# Patient Record
Sex: FEMALE | Race: WHITE | NOT HISPANIC OR LATINO | Employment: FULL TIME | URBAN - METROPOLITAN AREA
[De-identification: names, ages, dates, MRNs, and addresses within clinical notes are randomized per-mention and may not be internally consistent; named-entity substitution may affect disease eponyms.]

---

## 2021-02-23 ENCOUNTER — IMMUNIZATION (OUTPATIENT)
Dept: NURSING | Facility: CLINIC | Age: 46
End: 2021-02-23
Payer: COMMERCIAL

## 2021-02-23 PROCEDURE — 91300 PR COVID VAC PFIZER DIL RECON 30 MCG/0.3 ML IM: CPT

## 2021-02-23 PROCEDURE — 0001A PR COVID VAC PFIZER DIL RECON 30 MCG/0.3 ML IM: CPT

## 2021-03-07 ENCOUNTER — HEALTH MAINTENANCE LETTER (OUTPATIENT)
Age: 46
End: 2021-03-07

## 2021-03-16 ENCOUNTER — IMMUNIZATION (OUTPATIENT)
Dept: NURSING | Facility: CLINIC | Age: 46
End: 2021-03-16
Attending: INTERNAL MEDICINE
Payer: COMMERCIAL

## 2021-03-16 PROCEDURE — 91300 PR COVID VAC PFIZER DIL RECON 30 MCG/0.3 ML IM: CPT

## 2021-03-16 PROCEDURE — 0002A PR COVID VAC PFIZER DIL RECON 30 MCG/0.3 ML IM: CPT

## 2021-10-11 ENCOUNTER — HEALTH MAINTENANCE LETTER (OUTPATIENT)
Age: 46
End: 2021-10-11

## 2022-03-27 ENCOUNTER — HEALTH MAINTENANCE LETTER (OUTPATIENT)
Age: 47
End: 2022-03-27

## 2022-04-20 ENCOUNTER — MEDICAL CORRESPONDENCE (OUTPATIENT)
Dept: HEALTH INFORMATION MANAGEMENT | Facility: CLINIC | Age: 47
End: 2022-04-20
Payer: COMMERCIAL

## 2022-04-28 ENCOUNTER — TRANSCRIBE ORDERS (OUTPATIENT)
Dept: OTHER | Age: 47
End: 2022-04-28
Payer: COMMERCIAL

## 2022-04-28 DIAGNOSIS — R92.8 ABNORMAL MAMMOGRAM: Primary | ICD-10-CM

## 2022-05-02 ENCOUNTER — PATIENT OUTREACH (OUTPATIENT)
Dept: ONCOLOGY | Facility: CLINIC | Age: 47
End: 2022-05-02
Payer: COMMERCIAL

## 2022-05-02 NOTE — PROGRESS NOTES
New Patient Oncology Nurse Navigator Note     Referring provider: Dr Hernandez Vasquez     Referring Clinic/Organization:  from [facility]      Referred to (specialty:) Breast Provider Referral      Date Referral Received: May 2, 2022     Evaluation for:  Benign breast condition     Clinical History (per Nurse review of records provided):      Patient had a RIGHT breast diagnostic mammogram and ultrasound at [facility] on 4/19/22 after a distortion was noted in the right breast on recent screening mammogram.  On mammogram, additional views proved the distortion persists in the 12:00 position of the right breast, 3 cm deep to the nipple.  No central mass or calcifications.  On ultrasound there is a very subtle distortion, easiest to appreciate on the radial/slightly oblique plan at 12:00, 3-4 cm from the nipple.  No discrete mass and certainly not as obvious in the antiradial plane.  A stereotactic core biopsy of the distortion is recommended.  Given the current findings radial scar is most likely, though a lobular malignancy is also a consideration.      Records Location: Care Everywhere, Media and See Bookmarked material    IB request from Referrals team - Dr Hernandez Vasquez from [facility] (phone [phone] fax [phone]) is referring to ONC for dx: abnormal mammogram - requesting [facility] location - records needed from [facility]    She needs referral to the Breast center for a biopsy to rule out lobular cancer. Biopsy is scheduling for 5/16/22. Nae Stark RN

## 2022-05-19 ENCOUNTER — ANCILLARY PROCEDURE (OUTPATIENT)
Dept: MAMMOGRAPHY | Facility: CLINIC | Age: 47
End: 2022-05-19
Attending: FAMILY MEDICINE
Payer: COMMERCIAL

## 2022-05-19 DIAGNOSIS — N64.89 BREAST ASYMMETRY: ICD-10-CM

## 2022-05-19 PROCEDURE — 88305 TISSUE EXAM BY PATHOLOGIST: CPT | Mod: 26 | Performed by: PATHOLOGY

## 2022-05-19 PROCEDURE — 19081 BX BREAST 1ST LESION STRTCTC: CPT | Mod: RT | Performed by: STUDENT IN AN ORGANIZED HEALTH CARE EDUCATION/TRAINING PROGRAM

## 2022-05-19 PROCEDURE — 88305 TISSUE EXAM BY PATHOLOGIST: CPT | Mod: TC | Performed by: FAMILY MEDICINE

## 2022-05-19 RX ORDER — LIDOCAINE HYDROCHLORIDE AND EPINEPHRINE 10; 10 MG/ML; UG/ML
10 INJECTION, SOLUTION INFILTRATION; PERINEURAL ONCE
Status: COMPLETED | OUTPATIENT
Start: 2022-05-19 | End: 2022-05-19

## 2022-05-19 RX ADMIN — LIDOCAINE HYDROCHLORIDE AND EPINEPHRINE 10 ML: 10; 10 INJECTION, SOLUTION INFILTRATION; PERINEURAL at 10:17

## 2022-05-24 ENCOUNTER — TELEPHONE (OUTPATIENT)
Dept: MAMMOGRAPHY | Facility: CLINIC | Age: 47
End: 2022-05-24
Payer: COMMERCIAL

## 2022-05-24 DIAGNOSIS — R92.8 ABNORMAL FINDING ON BREAST IMAGING: Primary | ICD-10-CM

## 2022-05-24 DIAGNOSIS — Z80.3 FAMILY HISTORY OF MALIGNANT NEOPLASM OF BREAST: ICD-10-CM

## 2022-05-24 NOTE — PROGRESS NOTES
Right breast 12 o'clock stereotactic core biopsy was performed on 5/19/22.  Final Diagnosis  A(1). Right breast, 12 o'clock, stereotactic core biopsy:  -Benign breast tissue with fibrocystic change (including microcysts with apocrine metaplasia), stromal fibrosis,  and focal sclerosing adenosis  -Calcifications (scant) associated with benign acini  -No atypical or malignant findings    Telephoned and spoke briefly with Priyanka. She requested a call back tomorrow and we agreed to that plan. Nae Stark RN

## 2022-05-24 NOTE — TELEPHONE ENCOUNTER
Spoke to Priyanka about the benign findings from her breast biopsy done last week.  We discussed the Radiologist's recommendation of a surgical consultation to further assess the area and discuss next steps due to her family history.  A referral has been placed and someone from their office will reach out to help coordinate the appointment. Priyanka verbalized understanding and all questions and concerns were answered at this time.

## 2022-05-27 LAB
PATH REPORT.COMMENTS IMP SPEC: NORMAL
PATH REPORT.FINAL DX SPEC: NORMAL
PATH REPORT.GROSS SPEC: NORMAL
PATH REPORT.MICROSCOPIC SPEC OTHER STN: NORMAL
PATH REPORT.RELEVANT HX SPEC: NORMAL
PHOTO IMAGE: NORMAL

## 2022-05-30 NOTE — PROGRESS NOTES
"This is a 46 year old woman who I'm asked to see by Hernandez Vasquez for evaluation of  right breast lesion.  This was a density picked up on mammogram.  She underwent a needle biopsy which shows Normal tissue but this was felt to be discordant by radiology.  She has no palpable mass that she can feel.  She has a fairly significant family history of breast cancer.  Both her mother and an aunt have had breast cancer.    Past Medical History:  No past medical history on file.  No past surgical history on file.      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 Blood product orders may be delayed. Draw one red top and two purple top tubes for all Type and screen/Type and crossmatch orders.     Codeine      Morphine        Social History     Tobacco Use     Smoking status: Never Smoker     Smokeless tobacco: Never Used         Pertinent items are noted in HPI.    Physical exam:  Resp 16   Ht 1.664 m (5' 5.5\")   Wt 90.3 kg (199 lb)   Breastfeeding No   BMI 32.61 kg/m      General: alert, appears stated age, cooperative and mildly obese  Lungs: Clear to auscultation without rales or rhonchi  CV: regular rate and rhythm  Breast: No palpable masses in either breast.  Her right breast is somewhat smaller than the left.  No skin changes noted.  Axilla: no adenopathy      Imaging: Her mammogram and ultrasound were reviewed.    Impression: Mammographic lesion of the right  breast.  Needle biopsy was completely benign but seems discordant to the mammogram findings.  If it had shown a radial scar then I would say it was consistent and " not recommend anything further.  Discussed with the patient the option of going straight to a wire localization and excisional biopsy versus an MRI first.  She does have very dense breast tissue and a family history of breast cancer so I do think she needs something done.  After discussing it she would like to go with the MRI first but understands that if it is anything but completely normal, then I would recommend an excisional biopsy.    Plan: Breast MRI.  If completely normal then I think we can just continue to keep an eye in this area.  If it is not completely normal then she will need a wire localization and excisional biopsy.

## 2022-05-31 ENCOUNTER — OFFICE VISIT (OUTPATIENT)
Dept: SURGERY | Facility: CLINIC | Age: 47
End: 2022-05-31
Attending: FAMILY MEDICINE
Payer: COMMERCIAL

## 2022-05-31 VITALS — RESPIRATION RATE: 16 BRPM | BODY MASS INDEX: 31.98 KG/M2 | WEIGHT: 199 LBS | HEIGHT: 66 IN

## 2022-05-31 DIAGNOSIS — R92.8 MAMMOGRAPHIC BREAST LESION: Primary | ICD-10-CM

## 2022-05-31 PROCEDURE — G0463 HOSPITAL OUTPT CLINIC VISIT: HCPCS

## 2022-05-31 PROCEDURE — 99203 OFFICE O/P NEW LOW 30 MIN: CPT | Performed by: SPECIALIST

## 2022-05-31 RX ORDER — PSEUDOEPHEDRINE HCL 30 MG
TABLET ORAL EVERY 4 HOURS PRN
COMMUNITY

## 2022-05-31 RX ORDER — FLUTICASONE PROPIONATE 50 MCG
1 SPRAY, SUSPENSION (ML) NASAL DAILY
COMMUNITY

## 2022-05-31 RX ORDER — LORATADINE 10 MG/1
10 TABLET ORAL DAILY
COMMUNITY

## 2022-05-31 NOTE — NURSING NOTE
"Priyanka presents to [facility] today for a surgical consult with Dr. Matthews  regarding a right breast lesion. Patient had imaging and biopsy done at the , results were discordant per the radiologist.   RN assessment and EMR update.  Resp 16   Ht 1.664 m (5' 5.5\")   Wt 90.3 kg (199 lb)   Breastfeeding No   BMI 32.61 kg/m   Patient met with Dr. Matthews .  See dictation for details of visit. She will plan Breast MRI, scheduled this for her 6-7-22, [facility].  Support provided, invited calls.    RN time 22 mins.  "

## 2022-05-31 NOTE — LETTER
"    5/31/2022         RE: Priyanka Hathaway  [address] [place] [address]        Dear Colleague,    Thank you for referring your patient, Priyanka Hathaway, to the [facility] [place]. Please see a copy of my visit note below.    This is a 46 year old woman who I'm asked to see by Hernandez Vasquez for evaluation of  right breast lesion.  This was a density picked up on mammogram.  She underwent a needle biopsy which shows Normal tissue but this was felt to be discordant by radiology.  She has no palpable mass that she can feel.  She has a fairly significant family history of breast cancer.  Both her mother and an aunt have had breast cancer.    Past Medical History:  No past medical history on file.  No past surgical history on file.      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 Blood product orders may be delayed. Draw one red top and two purple top tubes for all Type and screen/Type and crossmatch orders.     Codeine      Morphine        Social History     Tobacco Use     Smoking status: Never Smoker     Smokeless tobacco: Never Used         Pertinent items are noted in HPI.    Physical exam:  Resp 16   Ht 1.664 m (5' 5.5\")   Wt 90.3 kg (199 lb)   Breastfeeding No   BMI 32.61 kg/m      General: alert, appears stated age, cooperative and mildly obese  Lungs: Clear to auscultation without rales or rhonchi  CV: regular rate and rhythm  Breast: No palpable masses in either breast.  Her right breast is somewhat smaller than the left.  No skin changes noted.  Axilla: no adenopathy      Imaging: Her " mammogram and ultrasound were reviewed.    Impression: Mammographic lesion of the right  breast.  Needle biopsy was completely benign but seems discordant to the mammogram findings.  If it had shown a radial scar then I would say it was consistent and not recommend anything further.  Discussed with the patient the option of going straight to a wire localization and excisional biopsy versus an MRI first.  She does have very dense breast tissue and a family history of breast cancer so I do think she needs something done.  After discussing it she would like to go with the MRI first but understands that if it is anything but completely normal, then I would recommend an excisional biopsy.    Plan: Breast MRI.  If completely normal then I think we can just continue to keep an eye in this area.  If it is not completely normal then she will need a wire localization and excisional biopsy.            Again, thank you for allowing me to participate in the care of your patient.        Sincerely,        Sophie Matthews MD

## 2022-06-07 ENCOUNTER — HOSPITAL ENCOUNTER (OUTPATIENT)
Facility: AMBULATORY SURGERY CENTER | Age: 47
End: 2022-06-07
Attending: SPECIALIST
Payer: COMMERCIAL

## 2022-06-07 ENCOUNTER — TELEPHONE (OUTPATIENT)
Dept: SURGERY | Facility: CLINIC | Age: 47
End: 2022-06-07
Payer: COMMERCIAL

## 2022-06-07 ENCOUNTER — HOSPITAL ENCOUNTER (OUTPATIENT)
Dept: MRI IMAGING | Facility: HOSPITAL | Age: 47
Discharge: HOME | End: 2022-06-07
Attending: SPECIALIST | Admitting: SPECIALIST
Payer: COMMERCIAL

## 2022-06-07 DIAGNOSIS — R92.8 MAMMOGRAPHIC BREAST LESION: ICD-10-CM

## 2022-06-07 DIAGNOSIS — R92.8 MAMMOGRAPHIC BREAST LESION: Primary | ICD-10-CM

## 2022-06-07 PROCEDURE — 77049 MRI BREAST C-+ W/CAD BI: CPT

## 2022-06-07 PROCEDURE — 255N000002 HC RX 255 OP 636: Performed by: SPECIALIST

## 2022-06-07 PROCEDURE — A9585 GADOBUTROL INJECTION: HCPCS | Performed by: SPECIALIST

## 2022-06-07 RX ORDER — GADOBUTROL 604.72 MG/ML
0.1 INJECTION INTRAVENOUS ONCE
Status: COMPLETED | OUTPATIENT
Start: 2022-06-07 | End: 2022-06-07

## 2022-06-07 RX ADMIN — GADOBUTROL 9 ML: 604.72 INJECTION INTRAVENOUS at 08:19

## 2022-06-07 NOTE — TELEPHONE ENCOUNTER
Spoke with Priyanka today to let her know I was able to get her surgery on next week per the request of Dr. Matthews. I have the date of 6/13 with an arrival time of 8:15 am.    Priyanka informed me she can't make this work because she will be out of town. I told her I can only see a next available the following week of 6/22 and I would consult with Dr. Matthews to confirm if that date would be okay. Once I have consulted with Dr. Matthews I will give Priyanka a call back.

## 2022-06-07 NOTE — LETTER
We've received instruction to get you scheduled for surgery with Dr Matthews. We have that arranged as follows:     Pre-op Physical:  Dr. Matthews will do your pre op physical the day of surgery    Surgery Date: 6/14/2022     Location: [facility]     Approximate Arrival Time: 10:00 am  (Unless instructed differently by the pre-op call nurse)       Prep Tasks and Info:   1. Review your medications with your primary care or prescribing physician; they will advise you which meds to stop and when, and when you can resume taking.  Certain medications needs to be stopped in advance of surgery to proceed safely.    2. You must get tested for COVID-19, even if you are vaccinated.    Outpatient Surgery:  If you are going home the same day of surgery, a home rapid antigen Covid-19 test is required 1-2 days before surgery- regardless of your vaccination status.  Take a photo of the negative result and show to the nurse on the day of surgery. If you test positive, contact our office right away to reschedule surgery. You can buy a home Covid-19 Rapid Antigen test at many local pharmacies, or you can order for free at covid.gov/tests.    Admits after Surgery:  If you are staying overnight or longer following surgery, a PCR test is required 4 days before surgery instead of the rapid antigen test.   Please schedule a PCR test with [employer] by calling [phone] or visit [email or web address].  You are permitted to have this done outside of our system but must fax the result to [phone].     3. Please shower the evening before and morning of surgery with Hibiclens or Exidine soap.  This can be found at your local pharmacy.    4. Fasting instructions will be provided by the pre-op nurse who will call you 1-3 days before surgery.  Typically we advise normal food up to 8 hours before surgery then clear liquids only up to 2 hours before surgery then nothing at all by mouth for 2 hours including  no gum or candy.  The nurse will review your specific instructions with you at the call.      5. You will need an adult to drive you home and stay with you 24 hours after surgery. Public transportation or Medical Van Services are not permitted.    6. You may have one family member wait in the lobby at the surgery center during your surgery. Visitor restrictions are subject to change, please verify with the pre-op nurse when they call.    7. If the community sees a new COVID19 surge, your procedure may need to be postponed. We will contact you if this happens. You will be screened for high-risk exposure to Covid-19 during the pre-op call.  We encourage you to quarantine yourself away from any Covid-19 people for 10 days before surgery to avoid possible last minute cancellations.   When you arrive to the surgery center, you will again be screened for COVID19 symptoms. If you screen positive, your surgery will need to be postponed.    8. We always encourage you to notify your insurance any time you have medical tests or procedures scheduled including surgery. The number is usually right on the back of your insurance card. Please call [facility] Cost of Care at [phone] if you'd like a surgery quote.       Call our office if you have any questions! Thank you!

## 2022-06-08 NOTE — TELEPHONE ENCOUNTER
Spoke with Priyanka washburn regarding surgery scheduling     Patient was informed of the followin. Patient has been informed to consult their PCP/Cardiologist about stopping their blood thinners about a week before surgery.  2. Pre Op Physical will need to be done by PCP or Surgeon see below  3. Required Covid Test with in 4 days prior to surgery. (See Date Below)  4. Ride required after surgery, can not use [employer] or public transportation.    Patient was informed that failure to do so may result in cancellation of surgery      We've received instruction to get you scheduled for surgery with Dr Matthews. We have that arranged as follows:     Pre-op Physical:  Dr. Matthews will do your pre op physical the day of surgery    Surgery Date: 2022     Location: [facility]     Approximate Arrival Time: 10:00 am  (Unless instructed differently by the pre-op call nurse)       Prep Tasks and Info:   1. Review your medications with your primary care or prescribing physician; they will advise you which meds to stop and when, and when you can resume taking.  Certain medications needs to be stopped in advance of surgery to proceed safely.    2. You must get tested for COVID-19, even if you are vaccinated.    Outpatient Surgery:  If you are going home the same day of surgery, a home rapid antigen Covid-19 test is required 1-2 days before surgery- regardless of your vaccination status.  Take a photo of the negative result and show to the nurse on the day of surgery. If you test positive, contact our office right away to reschedule surgery. You can buy a home Covid-19 Rapid Antigen test at many local pharmacies, or you can order for free at covid.gov/tests.    Admits after Surgery:  If you are staying overnight or longer following surgery, a PCR test is required 4 days before surgery instead of the rapid antigen test.   Please schedule a PCR test with [employer] by calling [phone] or visit  [email or web address].  You are permitted to have this done outside of our system but must fax the result to [phone].     3. Please shower the evening before and morning of surgery with Hibiclens or Exidine soap.  This can be found at your local pharmacy.    4. Fasting instructions will be provided by the pre-op nurse who will call you 1-3 days before surgery.  Typically we advise normal food up to 8 hours before surgery then clear liquids only up to 2 hours before surgery then nothing at all by mouth for 2 hours including no gum or candy.  The nurse will review your specific instructions with you at the call.      5. You will need an adult to drive you home and stay with you 24 hours after surgery. Public transportation or Medical Van Services are not permitted.    6. You may have one family member wait in the lobby at the surgery center during your surgery. Visitor restrictions are subject to change, please verify with the pre-op nurse when they call.    7. If the community sees a new COVID19 surge, your procedure may need to be postponed. We will contact you if this happens. You will be screened for high-risk exposure to Covid-19 during the pre-op call.  We encourage you to quarantine yourself away from any Covid-19 people for 10 days before surgery to avoid possible last minute cancellations.   When you arrive to the surgery center, you will again be screened for COVID19 symptoms. If you screen positive, your surgery will need to be postponed.    8. We always encourage you to notify your insurance any time you have medical tests or procedures scheduled including surgery. The number is usually right on the back of your insurance card. Please call  [employer] [facility] Cost of Care at [phone] if you'd like a surgery quote.       Call our office if you have any questions! Thank you!           Surgery Letter sent via [employer]

## 2022-06-09 ENCOUNTER — HOSPITAL ENCOUNTER (OUTPATIENT)
Facility: AMBULATORY SURGERY CENTER | Age: 47
End: 2022-06-09
Attending: SPECIALIST
Payer: COMMERCIAL

## 2022-06-10 VITALS — WEIGHT: 199 LBS | BODY MASS INDEX: 31.98 KG/M2 | HEIGHT: 66 IN

## 2022-06-13 RX ORDER — FENTANYL CITRATE 0.05 MG/ML
50 INJECTION, SOLUTION INTRAMUSCULAR; INTRAVENOUS
Status: CANCELLED | OUTPATIENT
Start: 2022-06-13

## 2022-06-13 RX ORDER — SODIUM CHLORIDE, SODIUM LACTATE, POTASSIUM CHLORIDE, CALCIUM CHLORIDE 600; 310; 30; 20 MG/100ML; MG/100ML; MG/100ML; MG/100ML
INJECTION, SOLUTION INTRAVENOUS CONTINUOUS
Status: CANCELLED | OUTPATIENT
Start: 2022-06-13

## 2022-06-13 RX ORDER — FENTANYL CITRATE 0.05 MG/ML
25 INJECTION, SOLUTION INTRAMUSCULAR; INTRAVENOUS
Status: CANCELLED | OUTPATIENT
Start: 2022-06-13

## 2022-06-13 RX ORDER — MEPERIDINE HYDROCHLORIDE 25 MG/ML
12.5 INJECTION INTRAMUSCULAR; INTRAVENOUS; SUBCUTANEOUS
Status: CANCELLED | OUTPATIENT
Start: 2022-06-13

## 2022-06-13 RX ORDER — LIDOCAINE 40 MG/G
CREAM TOPICAL
Status: CANCELLED | OUTPATIENT
Start: 2022-06-13

## 2022-06-13 RX ORDER — FENTANYL CITRATE 0.05 MG/ML
25 INJECTION, SOLUTION INTRAMUSCULAR; INTRAVENOUS EVERY 5 MIN PRN
Status: CANCELLED | OUTPATIENT
Start: 2022-06-13

## 2022-06-13 RX ORDER — HYDROMORPHONE HYDROCHLORIDE 1 MG/ML
0.5 INJECTION, SOLUTION INTRAMUSCULAR; INTRAVENOUS; SUBCUTANEOUS EVERY 5 MIN PRN
Status: CANCELLED | OUTPATIENT
Start: 2022-06-13

## 2022-06-13 RX ORDER — OXYCODONE HYDROCHLORIDE 5 MG/1
5 TABLET ORAL EVERY 4 HOURS PRN
Status: CANCELLED | OUTPATIENT
Start: 2022-06-13

## 2022-06-13 RX ORDER — ONDANSETRON 4 MG/1
4 TABLET, ORALLY DISINTEGRATING ORAL EVERY 30 MIN PRN
Status: CANCELLED | OUTPATIENT
Start: 2022-06-13

## 2022-06-13 RX ORDER — ONDANSETRON 2 MG/ML
4 INJECTION INTRAMUSCULAR; INTRAVENOUS EVERY 30 MIN PRN
Status: CANCELLED | OUTPATIENT
Start: 2022-06-13

## 2022-06-14 ENCOUNTER — ANCILLARY PROCEDURE (OUTPATIENT)
Dept: MAMMOGRAPHY | Facility: CLINIC | Age: 47
End: 2022-06-14
Attending: SPECIALIST
Payer: COMMERCIAL

## 2022-06-14 DIAGNOSIS — R92.8 MAMMOGRAPHIC BREAST LESION: ICD-10-CM

## 2022-06-14 PROCEDURE — 250N000009 HC RX 250: Performed by: SPECIALIST

## 2022-06-14 PROCEDURE — 272N000431 US BREAST BIOPSY CORE NEEDLE RIGHT

## 2022-06-14 PROCEDURE — 88305 TISSUE EXAM BY PATHOLOGIST: CPT | Mod: TC | Performed by: SPECIALIST

## 2022-06-14 PROCEDURE — 999N000065 MA POST PROCEDURE RIGHT

## 2022-06-14 PROCEDURE — 272N000717 US BREAST BIOPSY CORE NEEDLE RIGHT

## 2022-06-14 RX ADMIN — LIDOCAINE HYDROCHLORIDE 10 ML: 10 INJECTION, SOLUTION INFILTRATION; PERINEURAL at 11:04

## 2022-06-14 RX ADMIN — LIDOCAINE HYDROCHLORIDE 5 ML: 10 INJECTION, SOLUTION EPIDURAL; INFILTRATION; INTRACAUDAL; PERINEURAL at 11:16

## 2022-06-15 PROCEDURE — 88305 TISSUE EXAM BY PATHOLOGIST: CPT | Mod: 26 | Performed by: PATHOLOGY

## 2022-06-16 DIAGNOSIS — R92.8 MAMMOGRAPHIC BREAST LESION: Primary | ICD-10-CM

## 2022-06-30 ENCOUNTER — ANESTHESIA (OUTPATIENT)
Dept: SURGERY | Facility: HOSPITAL | Age: 47
End: 2022-06-30
Payer: COMMERCIAL

## 2022-06-30 ENCOUNTER — HOSPITAL ENCOUNTER (OUTPATIENT)
Facility: HOSPITAL | Age: 47
Discharge: HOME | End: 2022-06-30
Attending: SPECIALIST | Admitting: SPECIALIST
Payer: COMMERCIAL

## 2022-06-30 ENCOUNTER — ANCILLARY PROCEDURE (OUTPATIENT)
Dept: MAMMOGRAPHY | Facility: CLINIC | Age: 47
End: 2022-06-30
Attending: SPECIALIST
Payer: COMMERCIAL

## 2022-06-30 ENCOUNTER — ANESTHESIA EVENT (OUTPATIENT)
Dept: SURGERY | Facility: HOSPITAL | Age: 47
End: 2022-06-30
Payer: COMMERCIAL

## 2022-06-30 VITALS
BODY MASS INDEX: 32.66 KG/M2 | SYSTOLIC BLOOD PRESSURE: 128 MMHG | DIASTOLIC BLOOD PRESSURE: 82 MMHG | OXYGEN SATURATION: 98 % | RESPIRATION RATE: 17 BRPM | HEART RATE: 75 BPM | WEIGHT: 199.3 LBS | TEMPERATURE: 98 F

## 2022-06-30 DIAGNOSIS — R92.8 MAMMOGRAPHIC BREAST LESION: ICD-10-CM

## 2022-06-30 DIAGNOSIS — R92.8 MAMMOGRAPHIC BREAST LESION: Primary | ICD-10-CM

## 2022-06-30 PROCEDURE — 250N000011 HC RX IP 250 OP 636: Performed by: SPECIALIST

## 2022-06-30 PROCEDURE — 250N000011 HC RX IP 250 OP 636: Performed by: NURSE ANESTHETIST, CERTIFIED REGISTERED

## 2022-06-30 PROCEDURE — 19125 EXCISION BREAST LESION: CPT | Mod: RT | Performed by: SPECIALIST

## 2022-06-30 PROCEDURE — 710N000012 HC RECOVERY PHASE 2, PER MINUTE: Performed by: SPECIALIST

## 2022-06-30 PROCEDURE — 88305 TISSUE EXAM BY PATHOLOGIST: CPT | Mod: TC | Performed by: SPECIALIST

## 2022-06-30 PROCEDURE — 999N000065 MA POST PROCEDURE RIGHT

## 2022-06-30 PROCEDURE — 250N000009 HC RX 250: Performed by: SPECIALIST

## 2022-06-30 PROCEDURE — 272N000431 IMAGE GUIDED BREAST LOCALIZATION RIGHT

## 2022-06-30 PROCEDURE — 272N000001 HC OR GENERAL SUPPLY STERILE: Performed by: SPECIALIST

## 2022-06-30 PROCEDURE — 999N000141 HC STATISTIC PRE-PROCEDURE NURSING ASSESSMENT: Performed by: SPECIALIST

## 2022-06-30 PROCEDURE — 258N000003 HC RX IP 258 OP 636: Performed by: ANESTHESIOLOGY

## 2022-06-30 PROCEDURE — 370N000017 HC ANESTHESIA TECHNICAL FEE, PER MIN: Performed by: SPECIALIST

## 2022-06-30 PROCEDURE — 360N000074 HC SURGERY LEVEL 1, PER MIN: Performed by: SPECIALIST

## 2022-06-30 RX ORDER — FENTANYL CITRATE 50 UG/ML
25 INJECTION, SOLUTION INTRAMUSCULAR; INTRAVENOUS EVERY 5 MIN PRN
Status: DISCONTINUED | OUTPATIENT
Start: 2022-06-30 | End: 2022-06-30 | Stop reason: HOSPADM

## 2022-06-30 RX ORDER — ONDANSETRON 2 MG/ML
INJECTION INTRAMUSCULAR; INTRAVENOUS PRN
Status: DISCONTINUED | OUTPATIENT
Start: 2022-06-30 | End: 2022-06-30

## 2022-06-30 RX ORDER — HYDROCODONE BITARTRATE AND ACETAMINOPHEN 5; 325 MG/1; MG/1
1 TABLET ORAL
Status: DISCONTINUED | OUTPATIENT
Start: 2022-06-30 | End: 2022-06-30 | Stop reason: HOSPADM

## 2022-06-30 RX ORDER — FENTANYL CITRATE 50 UG/ML
25 INJECTION, SOLUTION INTRAMUSCULAR; INTRAVENOUS
Status: DISCONTINUED | OUTPATIENT
Start: 2022-06-30 | End: 2022-06-30 | Stop reason: HOSPADM

## 2022-06-30 RX ORDER — NALOXONE HYDROCHLORIDE 0.4 MG/ML
0.2 INJECTION, SOLUTION INTRAMUSCULAR; INTRAVENOUS; SUBCUTANEOUS
Status: DISCONTINUED | OUTPATIENT
Start: 2022-06-30 | End: 2022-06-30 | Stop reason: HOSPADM

## 2022-06-30 RX ORDER — SODIUM CHLORIDE, SODIUM LACTATE, POTASSIUM CHLORIDE, CALCIUM CHLORIDE 600; 310; 30; 20 MG/100ML; MG/100ML; MG/100ML; MG/100ML
INJECTION, SOLUTION INTRAVENOUS CONTINUOUS
Status: DISCONTINUED | OUTPATIENT
Start: 2022-06-30 | End: 2022-06-30 | Stop reason: HOSPADM

## 2022-06-30 RX ORDER — DEXAMETHASONE SODIUM PHOSPHATE 10 MG/ML
INJECTION, SOLUTION INTRAMUSCULAR; INTRAVENOUS PRN
Status: DISCONTINUED | OUTPATIENT
Start: 2022-06-30 | End: 2022-06-30

## 2022-06-30 RX ORDER — BUPIVACAINE HYDROCHLORIDE 2.5 MG/ML
INJECTION, SOLUTION INFILTRATION; PERINEURAL PRN
Status: DISCONTINUED | OUTPATIENT
Start: 2022-06-30 | End: 2022-06-30 | Stop reason: HOSPADM

## 2022-06-30 RX ORDER — LIDOCAINE 40 MG/G
CREAM TOPICAL
Status: DISCONTINUED | OUTPATIENT
Start: 2022-06-30 | End: 2022-06-30 | Stop reason: HOSPADM

## 2022-06-30 RX ORDER — ONDANSETRON 4 MG/1
4 TABLET, ORALLY DISINTEGRATING ORAL EVERY 30 MIN PRN
Status: DISCONTINUED | OUTPATIENT
Start: 2022-06-30 | End: 2022-06-30 | Stop reason: HOSPADM

## 2022-06-30 RX ORDER — FENTANYL CITRATE 50 UG/ML
INJECTION, SOLUTION INTRAMUSCULAR; INTRAVENOUS PRN
Status: DISCONTINUED | OUTPATIENT
Start: 2022-06-30 | End: 2022-06-30

## 2022-06-30 RX ORDER — NALOXONE HYDROCHLORIDE 0.4 MG/ML
0.4 INJECTION, SOLUTION INTRAMUSCULAR; INTRAVENOUS; SUBCUTANEOUS
Status: DISCONTINUED | OUTPATIENT
Start: 2022-06-30 | End: 2022-06-30 | Stop reason: HOSPADM

## 2022-06-30 RX ORDER — IBUPROFEN 200 MG
600 TABLET ORAL
Status: DISCONTINUED | OUTPATIENT
Start: 2022-06-30 | End: 2022-06-30 | Stop reason: HOSPADM

## 2022-06-30 RX ORDER — PROPOFOL 10 MG/ML
INJECTION, EMULSION INTRAVENOUS CONTINUOUS PRN
Status: DISCONTINUED | OUTPATIENT
Start: 2022-06-30 | End: 2022-06-30

## 2022-06-30 RX ORDER — MEPERIDINE HYDROCHLORIDE 25 MG/ML
12.5 INJECTION INTRAMUSCULAR; INTRAVENOUS; SUBCUTANEOUS
Status: DISCONTINUED | OUTPATIENT
Start: 2022-06-30 | End: 2022-06-30 | Stop reason: HOSPADM

## 2022-06-30 RX ORDER — ONDANSETRON 2 MG/ML
4 INJECTION INTRAMUSCULAR; INTRAVENOUS EVERY 30 MIN PRN
Status: DISCONTINUED | OUTPATIENT
Start: 2022-06-30 | End: 2022-06-30 | Stop reason: HOSPADM

## 2022-06-30 RX ORDER — PROPOFOL 10 MG/ML
INJECTION, EMULSION INTRAVENOUS PRN
Status: DISCONTINUED | OUTPATIENT
Start: 2022-06-30 | End: 2022-06-30

## 2022-06-30 RX ORDER — HYDROCODONE BITARTRATE AND ACETAMINOPHEN 5; 325 MG/1; MG/1
1-2 TABLET ORAL EVERY 4 HOURS PRN
Qty: 10 TABLET | Refills: 0 | Status: SHIPPED | OUTPATIENT
Start: 2022-06-30

## 2022-06-30 RX ORDER — OXYCODONE HYDROCHLORIDE 5 MG/1
5 TABLET ORAL EVERY 4 HOURS PRN
Status: DISCONTINUED | OUTPATIENT
Start: 2022-06-30 | End: 2022-06-30 | Stop reason: HOSPADM

## 2022-06-30 RX ORDER — HYDROMORPHONE HYDROCHLORIDE 1 MG/ML
0.2 INJECTION, SOLUTION INTRAMUSCULAR; INTRAVENOUS; SUBCUTANEOUS EVERY 5 MIN PRN
Status: DISCONTINUED | OUTPATIENT
Start: 2022-06-30 | End: 2022-06-30 | Stop reason: HOSPADM

## 2022-06-30 RX ADMIN — PROPOFOL 50 MG: 10 INJECTION, EMULSION INTRAVENOUS at 11:12

## 2022-06-30 RX ADMIN — LIDOCAINE HYDROCHLORIDE 10 ML: 10 INJECTION, SOLUTION EPIDURAL; INFILTRATION; INTRACAUDAL; PERINEURAL at 08:14

## 2022-06-30 RX ADMIN — PROPOFOL 150 MCG/KG/MIN: 10 INJECTION, EMULSION INTRAVENOUS at 11:14

## 2022-06-30 RX ADMIN — FENTANYL CITRATE 50 MCG: 50 INJECTION, SOLUTION INTRAMUSCULAR; INTRAVENOUS at 11:35

## 2022-06-30 RX ADMIN — FENTANYL CITRATE 50 MCG: 50 INJECTION, SOLUTION INTRAMUSCULAR; INTRAVENOUS at 11:15

## 2022-06-30 RX ADMIN — SODIUM CHLORIDE, POTASSIUM CHLORIDE, SODIUM LACTATE AND CALCIUM CHLORIDE: 600; 310; 30; 20 INJECTION, SOLUTION INTRAVENOUS at 11:05

## 2022-06-30 RX ADMIN — MIDAZOLAM 2 MG: 1 INJECTION INTRAMUSCULAR; INTRAVENOUS at 11:07

## 2022-06-30 RX ADMIN — PROPOFOL 50 MG: 10 INJECTION, EMULSION INTRAVENOUS at 11:14

## 2022-06-30 RX ADMIN — DEXAMETHASONE SODIUM PHOSPHATE 10 MG: 10 INJECTION, SOLUTION INTRAMUSCULAR; INTRAVENOUS at 11:15

## 2022-06-30 RX ADMIN — ONDANSETRON 4 MG: 2 INJECTION INTRAMUSCULAR; INTRAVENOUS at 11:15

## 2022-06-30 ASSESSMENT — ENCOUNTER SYMPTOMS: ORTHOPNEA: 1

## 2022-06-30 NOTE — ANESTHESIA CARE TRANSFER NOTE
Patient: Priyanka Hathaway    Procedure: Procedure(s):  Right Breast Biopsy after Wire Localization       Diagnosis: Mammographic breast lesion [R92.8]  Diagnosis Additional Information: No value filed.    Anesthesia Type:   MAC     Note:    Oropharynx: oropharynx clear of all foreign objects  Level of Consciousness: awake  Oxygen Supplementation: face mask  Level of Supplemental Oxygen (L/min / FiO2): 6  Independent Airway: airway patency satisfactory and stable  Dentition: dentition unchanged  Vital Signs Stable: post-procedure vital signs reviewed and stable  Report to RN Given: handoff report given  Patient transferred to: Phase II    Handoff Report: Identifed the Patient, Identified the Reponsible Provider, Reviewed the pertinent medical history, Discussed the surgical course, Reviewed Intra-OP anesthesia mangement and issues during anesthesia, Set expectations for post-procedure period and Allowed opportunity for questions and acknowledgement of understanding      Vitals:  Vitals Value Taken Time   /68 06/30/22 1158   Temp 36.8  C (98.3  F) 06/30/22 1158   Pulse 80 06/30/22 1158   Resp 17 06/30/22 1158   SpO2 99 % 06/30/22 1158   Vitals shown include unvalidated device data.    Electronically Signed By: KEIKO Wharton CRNA  June 30, 2022  12:00 PM

## 2022-06-30 NOTE — OP NOTE
Operative Note    Name:  Priyanka Hathaway  PCP:  Hernandez Vasquez  Procedure Date:  6/30/2022       Procedure:  Procedure(s):  Right Breast Biopsy after Wire Localization     Pre-Procedure Diagnosis:  Mammographic breast lesion [R92.8]     Post-Procedure Diagnosis:    Same     Surgeon(s):  Sophie Matthews MD     Assistant: None      Anesthesia Type:  MAC with Local       Findings:  Nothing particular suspicious on palpation.    Operative Report:    The patient was brought to the operating suite where she was placed in the supine position.  She was prepped and draped in a sterile fashion.  After infiltration with 1% lidocaine and quarter percent Marcaine  a curvilinear  incision was made adjacent to the wire in the upper outer right breast along the edge of the areola.  This is carried down to the crimp in the wire and the widely around the end of the wire  with electrocautery. It was removed and sent to pathology.  I palpated the wound and there is just another rim of somewhat more firm tissue along the superior medial edge of the breast biopsy site and I took another little rim of tissue and sent that also to pathology.  Because the surgery could not be done at the same-day surgery center attached to the breast center because of staffing issues, we were unable to get a specimen radiograph.  The wound was inspected for hemostasis and closed with 3-0 Vicryl to the subcutaneous tissues and a subcuticular suture for Monocryl to the skin.  Dressings were placed.  The patient procedure well.    Estimated Blood Loss:   5 cc    Specimens:    ID Type Source Tests Collected by Time Destination   1 : Right Breast Biopsy Biopsy Breast, Right SURGICAL PATHOLOGY EXAM Sophie Matthews MD 6/30/2022 11:39 AM            Drains:        Complications:    None    Sophie Matthews MD     Date: 6/30/2022  Time: 11:53 AM

## 2022-06-30 NOTE — INTERVAL H&P NOTE
"I have reviewed the surgical (or preoperative) H&P that is linked to this encounter, and examined the patient. There are no significant changes    Clinical Conditions Present on Arrival:  Clinically Significant Risk Factors Present on Admission                   # Obesity: Estimated body mass index is 32.66 kg/m  as calculated from the following:    Height as of 6/10/22: 1.664 m (5' 5.5\").    Weight as of this encounter: 90.4 kg (199 lb 4.8 oz).       "

## 2022-06-30 NOTE — ANESTHESIA POSTPROCEDURE EVALUATION
Patient: Priyanka Hathaway    Procedure: Procedure(s):  Right Breast Biopsy after Wire Localization       Anesthesia Type:  MAC    Note:  Disposition: Outpatient   Postop Pain Control: Uneventful            Sign Out: Well controlled pain   PONV: No   Neuro/Psych: Uneventful            Sign Out: Acceptable/Baseline neuro status   Airway/Respiratory: Uneventful            Sign Out: Acceptable/Baseline resp. status   CV/Hemodynamics: Uneventful            Sign Out: Acceptable CV status; No obvious hypovolemia; No obvious fluid overload   Other NRE: NONE   DID A NON-ROUTINE EVENT OCCUR? No           Last vitals:  Vitals Value Taken Time   /82 06/30/22 1245   Temp 36.7  C (98  F) 06/30/22 1245   Pulse 71 06/30/22 1258   Resp 17 06/30/22 1158   SpO2 97 % 06/30/22 1258   Vitals shown include unvalidated device data.    Electronically Signed By: Nazia Martell MD  June 30, 2022  4:12 PM

## 2022-07-07 ENCOUNTER — TELEPHONE (OUTPATIENT)
Dept: MAMMOGRAPHY | Facility: CLINIC | Age: 47
End: 2022-07-07

## 2022-07-07 PROCEDURE — 88342 IMHCHEM/IMCYTCHM 1ST ANTB: CPT | Mod: 26 | Performed by: PATHOLOGY

## 2022-07-07 PROCEDURE — 88341 IMHCHEM/IMCYTCHM EA ADD ANTB: CPT | Mod: 26 | Performed by: PATHOLOGY

## 2022-07-07 PROCEDURE — 88305 TISSUE EXAM BY PATHOLOGIST: CPT | Mod: 26 | Performed by: PATHOLOGY

## 2022-07-07 NOTE — TELEPHONE ENCOUNTER
Priyanka called and was asking if the results of her bx from last Thurs were back. I verified her name and  and told her that the result said no atypia or malignancy. She expressed understanding but wanted to know what her follow up should be as she had other neg results. I told her I would ask the question and get back with an answer but that DSO is in surgery, so I may not have a result until tomorrow. She expressed understanding and ended the call. Caleb RN, OCN, CBCN

## 2022-09-25 ENCOUNTER — HEALTH MAINTENANCE LETTER (OUTPATIENT)
Age: 47
End: 2022-09-25

## 2023-05-08 ENCOUNTER — HEALTH MAINTENANCE LETTER (OUTPATIENT)
Age: 48
End: 2023-05-08

## 2023-08-05 ENCOUNTER — HEALTH MAINTENANCE LETTER (OUTPATIENT)
Age: 48
End: 2023-08-05

## 2024-05-11 ENCOUNTER — HEALTH MAINTENANCE LETTER (OUTPATIENT)
Age: 49
End: 2024-05-11

## 2024-07-20 ENCOUNTER — HEALTH MAINTENANCE LETTER (OUTPATIENT)
Age: 49
End: 2024-07-20

## 2025-05-17 ENCOUNTER — HEALTH MAINTENANCE LETTER (OUTPATIENT)
Age: 50
End: 2025-05-17

## 2025-07-07 NOTE — PROGRESS NOTES
"  [place] Study Physical Exam  Study Description: The purpose of this study is to explore potential relationships between physiologic parameters collected from sensor data with physiological changes potentially induced by the administration of the COVID-19 vaccine.     Medical History Reviewed? Yes  After extensive review of the entire available medical record, to the best of my knowledge there is no reason to exclude this patient from the study.     Medical Decision Making (include details from chart review, discussion with Dr. MCCULLOUGH, etc): N/A    Physical Examination  For abnormal findings, please evaluate if the finding is Clinically Significant (by 'CS') or Not Clinically Significant (by 'NCS')  General Appearance   Normal  Head and Neck   Normal  Lungs     Normal  Cardiovascular   Normal   Do they have a stimulator/Pacemaker? No  Gastrointestinal   Normal   Problems swallowing medication? No  ANY history of diverticula (diverticulosis, diverticulitis, etc): No  Any history of GI surgery? No  Bowel habits: daily    Regular laxative use? No  Musculoskeletal/Extremities Normal   Lymph Nodes    Normal  Skin     Normal     Any Tattoos or Skin issues on the wrists or deltoid? No  Neurological    Normal   Any sleep disturbances? (Must get at least 5 hours a night) No 7 to 8 hours a night    Memory issues?  No    Tremor (If present document)  Absent  Any balance issues or recent falls?     No    Vitals:    07/08/25 0945   BP: 128/85   BP Location: Left arm   Patient Position: Sitting   Cuff Size: Adult Large   Pulse: 88   Resp: 18   SpO2: 99%   Weight: 85.7 kg (188 lb 14.4 oz)   Height: 1.651 m (5' 5\")    BMI 31.5      Immunization History   Administered Date(s) Administered    COVID-19 MONOVALENT 12+ (Pfizer) 02/23/2021, 03/16/2021, 11/02/2021    TDAP (Adacel,Boostrix) 05/22/2007, 04/17/2017    Zoster Vaccine, Unspecified (historical) 03/24/2025    Zoster recombinant adjuvanted (Shingrix) 11/18/2024, 03/18/2025 "   Reminders:  Are they using prescription pain meds? No  Any first degree relatives with inflammatory bowel disease? (Crohn's, ulcerative colitis, etc) No  Any serious medical issues that require treatment and evaluation? No  Any conditions they are following closely with their PCP? No  Have you had any serious issues with previous Covid-19 immunizations? No  COVID Vaccine Screening   Have you received a dose of the Covid-19 vaccine before?   Yes, Pfizer  Date of most recent Covid-19 vaccine dose:     02-November-2021   Do you currently have a health condition or are undergoing    treatment that makes you moderately to severely immunocompromised?* No  Have you ever had an allergic reaction to a Covid vaccine?**  No  Have you ever had an allergic reaction to another vaccine or injectable  medication?         No  Have you ever felt dizzy or faint before, during or after a shot?   No    *Ex: treatment of cancer, HIV, organ transplant recipient, immunosuppressive therapy, etc.     **This would include a severe allergic reaction (e.g., anaphylaxis that required treatment with epinephrine or caused you to go to the hospital. It would also include an allergic reaction that caused hives, swelling, or respiratory distress, including wheezing)    Is this subject eligible to receive a Covid-19 vaccine? Yes  Patient does fulfill study inclusion criteria and no exclusion criteria are found. Subject will continue in the study. This decision was made at 10:35    08-JUL-2025    Dana Pino NP

## 2025-07-08 ENCOUNTER — ALLIED HEALTH/NURSE VISIT (OUTPATIENT)
Dept: RESEARCH | Facility: CLINIC | Age: 50
End: 2025-07-08

## 2025-07-08 VITALS
WEIGHT: 188.9 LBS | SYSTOLIC BLOOD PRESSURE: 128 MMHG | OXYGEN SATURATION: 99 % | TEMPERATURE: 98.2 F | RESPIRATION RATE: 18 BRPM | HEART RATE: 88 BPM | DIASTOLIC BLOOD PRESSURE: 85 MMHG | BODY MASS INDEX: 31.47 KG/M2 | HEIGHT: 65 IN

## 2025-07-08 DIAGNOSIS — Z00.6 EXAMINATION OF PARTICIPANT OR CONTROL IN CLINICAL RESEARCH: Primary | ICD-10-CM

## 2025-07-08 PROCEDURE — 99207 PR NO CHARGE-RESEARCH SERVICE: CPT

## 2025-07-08 NOTE — PROGRESS NOTES
[place] Screening Study Note  Study Description: The purpose of this study is to explore potential relationships between physiologic parameters collected from sensor data with physiological changes potentially induced by the administration of the COVID-19 vaccine.       Subject ID:      Demographic Info  Priyanka Hathaway   1975          50 year old    SCREENING   Sex: Female   Pregnancy Screening:   Surgically Sterile: No  Over 55 years of age and have not had a menstrual cycle for >2 years: No  Last Menstrual Period Date: 01-July-2025          Multi Racial?: No; Primary: White   Ethnicity: Not  or      MEDICAL HISTORY REVIEW  Medical history, medications, allergies, surgical history and familial medical history were reviewed with the participant and verified by chart review.     Medical Conditions:   The table below represents their relevant medical history.   Has the subject experienced any relevant past and/or concomitant Medical History (e.g., chronic conditions such as hypertension, cardiovascular disease, stroke, diabetes, kidney disease, peripheral arterial disease, Raynaud's syndrome? Yes    Condition Ongoing? Start Date (MM/DD/YYYY) End Date (if applicable)   Migraine Yes APR- No     Any History of...  If any of the below are yes, the subject is a screen fail.  -Divertic___ (diverticulosis, diverticula, diverticulitis, etc.)? No  -Rheumatoid arthritis? No  Lupus? No  Hernia? (Other than inguinal or childhood umbilical)  No  Peptic Ulcer? No  Crohn's Disease? No  In any 1st degree family members? No  Colitis? No  Dysphagia? No  Parkinson s? No  Essential Tremor? No      Concomitant Medications:   The table below represents their current prescription, OTC, and supplement medications they are taking on a regular basis/have taken in the last 30 days.      Medication Name (Generic) Class Start Date (MM/DD/YYYY) Ongoing? Dose Unit Frequency Route Indication   fluticasone 50 MCG/ACT  "nasal spray (flonase)   Other MAY- Yes 50  ug (mcg) QD Nasal Prophylaxis   loratadine (claritin)  Other APR- Yes 10 mg QD Oral Prophylaxis   pseudoePHEDrine (sudafed) Other NOV- Yes 60 mg QD Oral Prophylaxis   ibuprofen Nonsteroidal Anti-Inflammatory Drugs   UN/UN/2008 Yes 600 mg PRN Oral Con Med Cond: Migraine       Allergies:   Does the subject have any known allergies to medications, food, a vaccine component, or latex? Yes  *If the participant has an allergy to something in not one of these 3 categories, include them in the medical history.*  Allergies   Allergen Reactions    Blood-Group Specific Substance      Patient has probable passive anti-D. Blood product orders may be delayed. Draw one red top and two purple top tubes for all Type and screen/Type and crossmatch orders.    Codeine     Morphine         Surgical History  Any history of gastrointestinal tract surgery involving the esophagus, stomach, or intestines? No.  If there are no GI surgeries, delete surgery smartlist section.     Family Medical History  No first degree relative has a history of any inflammatory bowel disease with suspected hereditary transmission (eg. Crohn's, ulcerative colitis, etc)     Family History   Problem Relation Age of Onset    Breast Cancer Mother     Other Problems  (occular melanoma) Paternal Grandmother     Other Problems  (Other Problems ) Other        Subject Characteristics     Vitals  /85 (BP Location: Left arm, Patient Position: Sitting, Cuff Size: Adult Large)   Pulse 88   Temp 98.2  F (36.8  C) (Oral)   Resp 18   Ht 1.651 m (5' 5\")   Wt 85.7 kg (188 lb 14.4 oz)   SpO2 99%   BMI 31.43 kg/m     *Reminder maximum BMI for enrollment is 44.6*      Perez Scale:  Wrist Circumference: Study Watch Wrist Preferred Watch Wrist Dominant Hand Watch Band Tightness:   1 18 cm Left Left Right Natural      Watch Size Preference: 41mm    ENROLLMENT    Was the visit performed? Yes   Date of " Enrollment: 08-JUL-2025. All procedures below occurred on this day.    Site Zip Code: Site Time Zone:  Site Location: Protocol Assigned: Eligibility Confirmed:   [address] [address]  Protocol A (COVID) Yes   Plan for Study Kit #1 Delivery: Given to Participant On-Site     [place] Device Accountability Prepped/Dispensed  Prepped & Dispensed Study Kit #1:  All Study Devices included? Yes (including Study Watch, Study Phone, Ambient Sensor, Oral Thermometer and Charging Accessories)  Is this a Replacement Kit? No    Study Phone  ID [employer] Research ID [place] Yo ID    [id number]     Study Watch  ID Model  Band Type    [id number] Series 9 Sport Loop     Was Study Kit #1 Shipped to the Participant? No  Were all expected devices received? Yes  Were there Device Issues? No  Was the Study Kit Replaced? No    All devices listed above were dispensed to the participant. Device ID were confirmed prior to dispensation.      Participant was thoroughly educated on study procedure and device care, staff highlighted the importance of compliance to study procedure. All questions and concerns were addressed, and informed participant to contact study coordinators for any questions. Subject was provided with a copy of the subject instructions for at home review.     Adverse Events Summary:*   Were any Adverse Events (AEs) experienced? No    Protocol Deviation Summary:*   Were there any Protocol Deviations?: No    *If Yes, please complete corresponding form.    Concomitant Medications:  As screening and enrollment appointments occurred on the same day, please refer to the concomitant medications documented above.        08-JUL-2025   Yadira Higginbotham

## 2025-07-08 NOTE — PROGRESS NOTES
[place] Inclusion/Exclusion Criteria:    Study Name: [place] (-[id number])      : Sunny Rose MD      Study Description: The purpose of this study is to explore potential relationships between physiologic parameters collected from sensor data with physiological changes potentially induced by the administration of the COVID-19 vaccine.     Protocol Version: 6.0 (Version Date: 2-APR-2025) Consent Version: 6.0 (Version Date: 23-JUN-2025)  The protocol and consent form were consulted to make inclusion and exclusion decisions along with the lana dated 27-February-2025 that addresses all [place] Phase 1.0 nuances and provides further clarification for some inclusion/exclusion criteria.     Inclusion #  Inclusion Criteria (ALL MUST BE YES)  YES/NO/N/A   1 Be at least 18 years old  Yes   2 Proficient in written and spoken English, defined by self-report   Yes   3 Willing and able to participate in the study procedures and data consent described in the consent form   Yes   4 Able to communicate effectively with and follow instructions from the Study Team    Yes   5   Eligible to receive the updated COVID-19 vaccine based on current CDC recommendations and vaccine prescribing information. (As determined by Sub-I) Yes   6   Able to disclose home address to a healthcare provider or Study Team member to enable (a) device shipping (if necessary) and (b) a 911 call in case of potential emergency (home address will not be kept as study data)  Yes   7    Able to adhere to Lifestyle Considerations (see Section 5.3) throughout study duration (as applicable). These include avoiding taking certain over-the-counter pain relievers or fever reducing medications around the time of vaccination, not taking any recreational drugs (e.g. methamphetamines, cocaine, opioids, cannabis, LSD)  within 72 hours prior to, during and after the ingestible temperature sensor monitoring period; and abstaining from strenuous  "exercise, ingestion of hot or cold liquids, eating food, chewing gum or mints, brushing teeth or smoking 30 minutes before taking oral temperature measurements.  Yes   8   Participant has their own reliable high-speed broadband internet at their home and active at the time of data collection  Yes   9   Have a personal computer, desktop, laptop, tablet, or smartphone with audiovisual capabilities Yes   If any inclusion criteria marked \"No\" please provide detail (If all Yes, N/A): N/A        Exclusion # Exclusion Criteria (ALL MUST BE NO) YES/NO/N/A   1 Participants with tattoos, skin problems or wound(s) on/in the wrist or deltoid (ex: injured or friable skin, skin disorders, or allergic skin reactions, such as eczema, rosacea, impetigo, dermatomyositis, or allergic contact dermatitis), that can interfere with study setup/assessments/vaccination  No   2 Individuals who are pregnant or plan to become pregnant during the study  No   3 Anything that may interfere with proper physiological data acquisition, such as an implantable device (e.g., cardiac pacemaker, automated implantable cardioverter-defibrillator, deep brain stimulator, Inspire upper airway stimulation device) and casts or body braces No   4 Participants that are diagnosed or are suspected to have illnesses affecting motion: e.g., Parkinson's, Essential Tremor, Dystonia, or others at investigator's discretion No   5 Participants that are diagnosed with a condition or taking a medication that impairs the immune system (i.e., active cancer, HIV/AIDS, organ/stem cell transplant recipient, autoimmune disorders, primary immunodeficiencies) No   6 Participants with any medical history, vital sign, or any other study procedure finding/assessment that in the opinion of the investigator could compromise participant safety during study participation or interfere with the study integrity and/or the accurate assessment of the study objectives No   7 Daily use of OTC or " "prescription medications with antipyretic properties at time of enrollment that is anticipated to continue during the CBT sensor data collection period surrounding administration of vaccines. Low dose aspirin (81 mg or less per day) taken for preventative purposes is permissible No   8 Individuals who are unwilling to avoid taking OTC pain relievers and anti-pyrectics for acute mild to moderate pain and fever associated with vaccine administration during the data collection days surrounding its administration No   9 Participant works for a company that develops or sells medical and/or fitness devices (e.g., ECG monitors, wearable fitness bands, sleep monitors, etc.) or are technology journalists (e.g., professional bloggers, TV, magazine, newspaper reporters, etc.) No   10 Overnight travel or travel between time zones planned during CBT sensor data collection nights No   11 Participants with planned overnight travel totaling >= 7 nights during duration of study data collection period No   12 Participant plans on moving or changing address within the study period No   13 Participant is employed in overnight shift work, or otherwise does not maintain a reasonably consistent day/night schedule (e.g., participants who are unable to regularly go to bed between 7pm to 2am and wake up between 4am to 12pm on average >= 3 times a week) No   14 Participants with clinically relevant sleep disturbances and unable to achieve at least 4 hours of continuous sleep on average each night No   If any exclusion criteria marked \"Yes\" please provide detail (If all No, N/A): N/A    Exclusion (a) # Exclusion criteria related to the COVID-19 vaccine:   (ALL MUST BE NO) YES/NO/N/A   1 Participants with a known history of a severe allergic reaction (e.g., anaphylaxis) to any component of the COVID-19 vaccine. No   2 Participants who experienced severe side effects following previous administration of the COVID-19 vaccine including " "myocarditis, pericarditis, thrombosis, or thrombocytopenia No   3 Participants in whom an additional COVID-19 vaccine is contraindicated. No   If any exclusion criteria marked \"Yes\" please provide detail (If all No, N/A): N/A    Exclusion (b) # Exclusion criteria related to Ingestible Temperature Sensor:   (ALL MUST BE NO) YES/NO/N/A   1 Participants under the age of 18 No   2 Participant weighs less than 40 kg (88 lbs.) or BMI greater than 44.6 No   3 Participants who are pregnant No   4 Participants with a known diagnosis of obstructive disease of the gastrointestinal tract or a known hernia, Crohn's disease, diverticulitis, or other inflammatory bowel disease. No   5 Participants with a 1st degree relative with any inflammatory bowel disease with suspected hereditary transmission (e.g., Crohn's disease, or ulcerative colitis) No   6 Participants with known history of disordered or impaired gag reflex  No   7 Participants who have problems swallowing food or pills (e.g., dysphagia) No   8 Participants with previous gastrointestinal tract surgery involving the esophagus, stomach, or intestines, excluding intraluminal endoscopy. No   9 Participants with known diagnosis of hypo-motility disorders of the gastrointestinal tract (including chronic constipation with fewer than three spontaneous bowel movements per week No   10 Participants with chronic diarrhea, as defined by 3 or more episodes of diarrhea per week for the last 30 days or >= 3 bowel movements per day No   11 Participants with known diagnosis of felinization of the esophagus (unusual folding of the esophagus)  No   12 Participants with Zenker's diverticulum (a pouch that forms in the upper part of the esophagus) and people with a history of other diverticula.  No   13 Participants who may undergo NMR or MRI scanning within one week of CBT sensor ingestion No   14 Participants with an implantable pulse generator or implantable electro-medical device of any " "kind (e.g., pacemakers (or implantable pulse generators), implantable cardioverter defibrillators (ICDs), deep brain stimulation (DBS) devices, and left ventricular assist devices (LVADs). No   15 Participants with an implanted or temporarily implanted device that uses an external power-source. No   If any exclusion criteria marked \"Yes\" please provide detail (If all No, N/A): N/A    Will the participant continue in the study? Yes  (If \"No\", follow instructions for handling of Screen Failures)    If the participant is eligible to continue in the study, inclusion/exclusion criteria above will be sent to the PI for co-sign.    Enrollment Date: 08-JUL-2025      MD Yadira Lebron    "

## 2025-07-08 NOTE — PROGRESS NOTES
[place] Study Consent    Study Description: The purpose of this study is to explore potential relationships between physiologic parameters collected from sensor data with physiological changes potentially induced by the administration of the COVID-19 vaccine.    Priyanka Hathaway a 50 year old female, was on-site today at [facility] to discuss participation for [place] (-[id number])       The consent form was reviewed with the patient.     The review of the study included:  Study Purpose      Participant Duration, Responsibilities & Expectations    Study Data and Devices    Benefits and Risks of Participation    Compensation and Costs of Participation    Coded Study Data  Voluntary Participation    Study Restrictions  Confidentiality Obligations/Privacy-Related Risks   Injury, Legal, and Data Rights    Authorization to Use and Disclose Your Protected Health Information    Protocol Version: 6.0   Principle Investigator: Sunny Rose MD    Subject Number: [id number]       The subject was queried in regards to her willingness to continue and her questions were answered to her satisfaction. The patient has given her agreement to volunteer and participate in the above noted study.     The eConsent and HIPAA form version (Version 6.0 Date 23-Jun-2025) was signed on  08-JUL-2025 with the [facility] Unit of [facility].     A copy of the [place] consent will be placed in subject's medical record. A copy of the consent form was provided to the subject today.    Study data is directly entered into Epic and eBillme per protocol. No study procedures were done prior to Priyanka Hathaway providing informed consent.       08-JUL-2025    Yadira Higginbotham

## 2025-08-04 ENCOUNTER — ALLIED HEALTH/NURSE VISIT (OUTPATIENT)
Dept: RESEARCH | Facility: CLINIC | Age: 50
End: 2025-08-04

## 2025-08-04 DIAGNOSIS — Z00.6 EXAMINATION OF PARTICIPANT OR CONTROL IN CLINICAL RESEARCH: Primary | ICD-10-CM

## 2025-08-04 PROCEDURE — 99207 PR NO CHARGE-RESEARCH SERVICE: CPT

## 2025-08-06 ENCOUNTER — VIRTUAL VISIT (OUTPATIENT)
Dept: RESEARCH | Facility: CLINIC | Age: 50
End: 2025-08-06

## 2025-08-06 DIAGNOSIS — Z00.6 RESEARCH SUBJECT: Primary | ICD-10-CM

## 2025-08-06 PROCEDURE — 99207 PR NO CHARGE NURSE ONLY: CPT | Mod: 93

## 2025-08-07 ENCOUNTER — ALLIED HEALTH/NURSE VISIT (OUTPATIENT)
Dept: RESEARCH | Facility: CLINIC | Age: 50
End: 2025-08-07

## 2025-08-07 DIAGNOSIS — Z23 HIGH PRIORITY FOR 2019-NCOV VACCINE: ICD-10-CM

## 2025-08-07 DIAGNOSIS — Z00.6 EXAMINATION OF PARTICIPANT OR CONTROL IN CLINICAL RESEARCH: Primary | ICD-10-CM

## 2025-08-12 ENCOUNTER — VIRTUAL VISIT (OUTPATIENT)
Dept: RESEARCH | Facility: CLINIC | Age: 50
End: 2025-08-12

## 2025-08-12 DIAGNOSIS — Z00.6 RESEARCH SUBJECT: Primary | ICD-10-CM

## 2025-08-12 PROCEDURE — 99207 PR NO CHARGE NURSE ONLY: CPT | Mod: 93

## 2025-08-26 ENCOUNTER — ALLIED HEALTH/NURSE VISIT (OUTPATIENT)
Dept: RESEARCH | Facility: CLINIC | Age: 50
End: 2025-08-26

## 2025-08-26 DIAGNOSIS — Z00.6 RESEARCH SUBJECT: Primary | ICD-10-CM

## 2025-08-26 PROCEDURE — 99207 PR NO CHARGE-RESEARCH SERVICE: CPT

## (undated) DEVICE — TAPE TRANSPORE 2"X1.5YD 1534S-2

## (undated) DEVICE — NDL 25GA 2"  8881200441

## (undated) DEVICE — GOWN LG DISP 9515

## (undated) DEVICE — GLOVE SURG PI ULTRA TOUCH M SZ 6-1/2 LF

## (undated) DEVICE — SU SILK 2-0 SH 30" K833H

## (undated) DEVICE — ESU PENCIL SMOKE EVAC W/ROCKER SWITCH 0703-047-000

## (undated) DEVICE — SUCTION MANIFOLD NEPTUNE 2 SYS 1 PORT 702-025-000

## (undated) DEVICE — DRESSING PRIMAPORE 4 X 3-1/8 66000317

## (undated) DEVICE — TUBING SUCTION MEDI-VAC 1/4"X20' N620A - HE

## (undated) DEVICE — CUSTOM PACK MINOR SBA5BMNHEA

## (undated) DEVICE — PREP DURAPREP 26ML APL 8630

## (undated) DEVICE — DRSG GAUZE 4X4" 3033

## (undated) DEVICE — DRAPE CHEST 100X72X124 89227

## (undated) DEVICE — PLATE GROUNDING ADULT W/CORD 9165L

## (undated) DEVICE — SU MONOCRYL+ 4-0 18IN PS2 UND MCP496G

## (undated) DEVICE — SU VICRYL+ 3-0 27IN SH UND VCP416H

## (undated) DEVICE — SOL WATER IRRIG 1000ML BOTTLE 2F7114

## (undated) DEVICE — SOL NACL 0.9% IRRIG 1000ML BOTTLE 2F7124

## (undated) DEVICE — SUCTION TIP POOLE STERILE 35040

## (undated) DEVICE — SYR 10ML LL W/O NDL 302995

## (undated) DEVICE — DRSG STERI STRIP 1/2X4" R1547

## (undated) RX ORDER — PROPOFOL 10 MG/ML
INJECTION, EMULSION INTRAVENOUS
Status: DISPENSED
Start: 2022-06-30

## (undated) RX ORDER — ONDANSETRON 2 MG/ML
INJECTION INTRAMUSCULAR; INTRAVENOUS
Status: DISPENSED
Start: 2022-06-30

## (undated) RX ORDER — FENTANYL CITRATE 50 UG/ML
INJECTION, SOLUTION INTRAMUSCULAR; INTRAVENOUS
Status: DISPENSED
Start: 2022-06-30

## (undated) RX ORDER — DEXAMETHASONE SODIUM PHOSPHATE 10 MG/ML
INJECTION INTRAMUSCULAR; INTRAVENOUS
Status: DISPENSED
Start: 2022-06-30

## (undated) RX ORDER — BUPIVACAINE HYDROCHLORIDE 2.5 MG/ML
INJECTION, SOLUTION EPIDURAL; INFILTRATION; INTRACAUDAL
Status: DISPENSED
Start: 2022-06-30